# Patient Record
Sex: MALE | Race: WHITE | NOT HISPANIC OR LATINO | URBAN - METROPOLITAN AREA
[De-identification: names, ages, dates, MRNs, and addresses within clinical notes are randomized per-mention and may not be internally consistent; named-entity substitution may affect disease eponyms.]

---

## 2019-05-17 NOTE — PATIENT DISCUSSION
New Prescription: Pred Forte (prednisolone acetate): drops,suspension: 1% 1 drop as directed into both eyes 05-

## 2019-05-17 NOTE — PATIENT DISCUSSION
PATIENT WAS ADVISED OF THE INCREASED RISK OF NIGHT VISION SYMPTOMS ASSOCIATED WITH LARGER PUPILS. I DISCUSSED IN DETAIL THE POSSIBILITY OF GLARE, HALOS, STEAMERS AROUND LIGHTS AND STARBURSTS AT NIGHT THAT MAY AFFECT VISION FOR WORK. DETAILS OF THE STUDY PERFORMED BY THE  AND FDA TO DETERMINE PROBABILITY OF GLARE AS A RESULT OF HAVING LASIK WAS REVIEWED WITH THE PATIENT. PATIENT WAS ADVISED THAT EYE DROPS  MAY HELP TO DECREASE SOME OF THESE SYMPTOMS BUT THAT THERE IS NO GUARANTEE.

## 2019-05-17 NOTE — PATIENT DISCUSSION
New Prescription: Zymaxid (gatifloxacin): drops: 0.5% 1 drop four times a day as directed into affected eye 05-

## 2019-05-23 NOTE — PATIENT DISCUSSION
MYOPIA, OU- DISC OPT OF REFRACTIVE [id number]. PT UNDERSTANDS OPTIONS AND DESIRES TO PROCEED WITH LASIK TO IMPROVE VA AND REDUCE DEPENDENCY ON GLS/CTLS.

## 2019-05-23 NOTE — PATIENT DISCUSSION
HIGH ASTIGMATISM, OU- DISCUSSED INCREASED RISK OF GLARE AND HALOS AROUND LIGHTS WITH A 1% CHANCE OF LIMITING EVERYDAY ACTIVITIES, ALTHOUGH UNLIKELY.

## 2019-05-23 NOTE — PATIENT DISCUSSION
DISC W PT THAT THE NEED FOR READING GLASSES USUALLY OCCURS AT AGE [age] WHEN THAT TIME GOES. DISC EVEN POST LASIK, READING GLASSES WILL BE NEEDED.

## 2019-05-24 NOTE — PATIENT DISCUSSION
Continue: Zymaxid (gatifloxacin): drops: 0.5% 1 drop four times a day as directed into affected eye 05-

## 2019-05-24 NOTE — PATIENT DISCUSSION
Continue: Pred Forte (prednisolone acetate): drops,suspension: 1% 1 drop as directed into both eyes 05-

## 2020-07-30 ENCOUNTER — IMPORTED ENCOUNTER (OUTPATIENT)
Dept: URBAN - METROPOLITAN AREA CLINIC 50 | Facility: CLINIC | Age: 70
End: 2020-07-30

## 2020-08-25 ENCOUNTER — IMPORTED ENCOUNTER (OUTPATIENT)
Dept: URBAN - METROPOLITAN AREA CLINIC 50 | Facility: CLINIC | Age: 70
End: 2020-08-25

## 2021-04-17 ASSESSMENT — VISUAL ACUITY
OD_OTHER: 20/60. 20/100.
OD_CC: J1+
OS_BAT: 20/40
OS_OTHER: 20/40. 20/80.
OD_SC: 20/40
OD_BAT: 20/60
OS_PH: 20/25
OS_CC: J1+
OS_SC: 20/30

## 2021-04-17 ASSESSMENT — TONOMETRY
OS_IOP_MMHG: 18
OD_IOP_MMHG: 16

## 2021-08-24 ENCOUNTER — PREPPED CHART (OUTPATIENT)
Dept: URBAN - METROPOLITAN AREA CLINIC 53 | Facility: CLINIC | Age: 71
End: 2021-08-24

## 2022-04-04 ENCOUNTER — APPOINTMENT (RX ONLY)
Dept: URBAN - METROPOLITAN AREA CLINIC 57 | Facility: CLINIC | Age: 72
Setting detail: DERMATOLOGY
End: 2022-04-04

## 2022-04-04 DIAGNOSIS — L82.0 INFLAMED SEBORRHEIC KERATOSIS: ICD-10-CM

## 2022-04-04 DIAGNOSIS — L81.4 OTHER MELANIN HYPERPIGMENTATION: ICD-10-CM

## 2022-04-04 DIAGNOSIS — Z12.83 ENCOUNTER FOR SCREENING FOR MALIGNANT NEOPLASM OF SKIN: ICD-10-CM

## 2022-04-04 DIAGNOSIS — D18.0 HEMANGIOMA: ICD-10-CM

## 2022-04-04 DIAGNOSIS — L82.1 OTHER SEBORRHEIC KERATOSIS: ICD-10-CM

## 2022-04-04 DIAGNOSIS — L57.0 ACTINIC KERATOSIS: ICD-10-CM

## 2022-04-04 DIAGNOSIS — D22 MELANOCYTIC NEVI: ICD-10-CM

## 2022-04-04 DIAGNOSIS — L91.0 HYPERTROPHIC SCAR: ICD-10-CM

## 2022-04-04 PROBLEM — D18.01 HEMANGIOMA OF SKIN AND SUBCUTANEOUS TISSUE: Status: ACTIVE | Noted: 2022-04-04

## 2022-04-04 PROBLEM — D22.5 MELANOCYTIC NEVI OF TRUNK: Status: ACTIVE | Noted: 2022-04-04

## 2022-04-04 PROCEDURE — ? FULL BODY SKIN EXAM

## 2022-04-04 PROCEDURE — ? SUNSCREEN RECOMMENDATIONS

## 2022-04-04 PROCEDURE — ? COUNSELING

## 2022-04-04 PROCEDURE — ? LIQUID NITROGEN

## 2022-04-04 PROCEDURE — 17000 DESTRUCT PREMALG LESION: CPT | Mod: 59

## 2022-04-04 PROCEDURE — 99203 OFFICE O/P NEW LOW 30 MIN: CPT | Mod: 25

## 2022-04-04 PROCEDURE — ? ADDITIONAL NOTES

## 2022-04-04 PROCEDURE — 17003 DESTRUCT PREMALG LES 2-14: CPT | Mod: 59

## 2022-04-04 PROCEDURE — 17111 DESTRUCTION B9 LESIONS 15/>: CPT

## 2022-04-04 ASSESSMENT — LOCATION DETAILED DESCRIPTION DERM
LOCATION DETAILED: RIGHT POSTERIOR SHOULDER
LOCATION DETAILED: RIGHT SUPERIOR LATERAL LOWER BACK
LOCATION DETAILED: LEFT INFERIOR LATERAL MIDBACK
LOCATION DETAILED: RIGHT INFERIOR LATERAL UPPER BACK
LOCATION DETAILED: RIGHT INFERIOR LATERAL MIDBACK
LOCATION DETAILED: POSTERIOR MID-PARIETAL SCALP
LOCATION DETAILED: LEFT MEDIAL UPPER BACK
LOCATION DETAILED: LEFT MID-UPPER BACK
LOCATION DETAILED: RIGHT CENTRAL ZYGOMA
LOCATION DETAILED: LEFT LATERAL UPPER BACK
LOCATION DETAILED: RIGHT LATERAL UPPER BACK
LOCATION DETAILED: LEFT MEDIAL INFERIOR CHEST
LOCATION DETAILED: RIGHT MID-UPPER BACK
LOCATION DETAILED: LEFT INFERIOR MEDIAL UPPER BACK
LOCATION DETAILED: RIGHT MEDIAL UPPER BACK
LOCATION DETAILED: XIPHOID
LOCATION DETAILED: RIGHT SUPERIOR UPPER BACK
LOCATION DETAILED: LEFT SUPERIOR LATERAL LOWER BACK
LOCATION DETAILED: LEFT POSTERIOR SHOULDER
LOCATION DETAILED: RIGHT SUPERIOR LATERAL UPPER BACK
LOCATION DETAILED: RIGHT SUPERIOR LATERAL MIDBACK
LOCATION DETAILED: RIGHT SUPERIOR MEDIAL MIDBACK
LOCATION DETAILED: LEFT SUPERIOR LATERAL MIDBACK
LOCATION DETAILED: LEFT SUPERIOR HELIX
LOCATION DETAILED: SUPERIOR LUMBAR SPINE
LOCATION DETAILED: LEFT INFERIOR UPPER BACK
LOCATION DETAILED: RIGHT CENTRAL TEMPLE
LOCATION DETAILED: RIGHT INFERIOR UPPER BACK
LOCATION DETAILED: RIGHT LATERAL TRAPEZIAL NECK

## 2022-04-04 ASSESSMENT — LOCATION SIMPLE DESCRIPTION DERM
LOCATION SIMPLE: RIGHT LOWER BACK
LOCATION SIMPLE: RIGHT UPPER BACK
LOCATION SIMPLE: LOWER BACK
LOCATION SIMPLE: RIGHT ZYGOMA
LOCATION SIMPLE: LEFT UPPER BACK
LOCATION SIMPLE: ABDOMEN
LOCATION SIMPLE: RIGHT SHOULDER
LOCATION SIMPLE: RIGHT TEMPLE
LOCATION SIMPLE: POSTERIOR SCALP
LOCATION SIMPLE: LEFT LOWER BACK
LOCATION SIMPLE: CHEST
LOCATION SIMPLE: POSTERIOR NECK
LOCATION SIMPLE: LEFT EAR
LOCATION SIMPLE: LEFT SHOULDER

## 2022-04-04 ASSESSMENT — LOCATION ZONE DERM
LOCATION ZONE: FACE
LOCATION ZONE: NECK
LOCATION ZONE: ARM
LOCATION ZONE: SCALP
LOCATION ZONE: EAR
LOCATION ZONE: TRUNK

## 2022-04-04 NOTE — PROCEDURE: LIQUID NITROGEN
Post-Care Instructions: I reviewed with the patient in detail post-care instructions. Patient is to wear sunprotection, and avoid picking at any of the treated lesions. Pt may apply Vaseline to crusted or scabbing areas.
Number Of Freeze-Thaw Cycles: 1 freeze-thaw cycle
Show Applicator Variable?: Yes
Detail Level: Detailed
Duration Of Freeze Thaw-Cycle (Seconds): 3
Application Tool (Optional): Liquid Nitrogen Sprayer
Render Post-Care Instructions In Note?: no
Consent: The patient's consent was obtained including but not limited to risks of crusting, scabbing, blistering, scarring, darker or lighter pigmentary change, recurrence, incomplete removal and infection.
Medical Necessity Information: It is in your best interest to select a reason for this procedure from the list below. All of these items fulfill various CMS LCD requirements except the new and changing color options.
Spray Paint Text: The liquid nitrogen was applied to the skin utilizing a spray paint frosting technique.
Medical Necessity Clause: This procedure was medically necessary because the lesions that were treated were:

## 2022-07-12 ENCOUNTER — COMPREHENSIVE EXAM (OUTPATIENT)
Dept: URBAN - METROPOLITAN AREA CLINIC 53 | Facility: CLINIC | Age: 72
End: 2022-07-12

## 2022-07-12 DIAGNOSIS — R73.03: ICD-10-CM

## 2022-07-12 DIAGNOSIS — H43.813: ICD-10-CM

## 2022-07-12 DIAGNOSIS — H25.13: ICD-10-CM

## 2022-07-12 PROCEDURE — 92014 COMPRE OPH EXAM EST PT 1/>: CPT

## 2022-07-12 ASSESSMENT — VISUAL ACUITY
OD_SC: 20/40
OS_SC: 20/30-1
OU_CC: J1+
OS_GLARE: 20/25
OS_PH: 20/25
OD_PH: 20/25
OS_GLARE: 20/30
OD_GLARE: 20/30

## 2022-07-12 ASSESSMENT — TONOMETRY
OD_IOP_MMHG: 17
OS_IOP_MMHG: 17

## 2023-08-23 ENCOUNTER — EMERGENCY VISIT (OUTPATIENT)
Dept: URBAN - METROPOLITAN AREA CLINIC 53 | Facility: CLINIC | Age: 73
End: 2023-08-23

## 2023-08-23 DIAGNOSIS — H25.13: ICD-10-CM

## 2023-08-23 DIAGNOSIS — E11.9: ICD-10-CM

## 2023-08-23 DIAGNOSIS — H43.813: ICD-10-CM

## 2023-08-23 PROCEDURE — 92014 COMPRE OPH EXAM EST PT 1/>: CPT

## 2023-08-23 PROCEDURE — 92134 CPTRZ OPH DX IMG PST SGM RTA: CPT

## 2023-08-23 ASSESSMENT — VISUAL ACUITY
OS_GLARE: 20/25-2
OD_GLARE: 20/30
OS_SC: 20/25
OD_SC: 20/30-1
OD_GLARE: 20/30-2
OS_GLARE: 20/30
OD_PH: 20/20

## 2023-08-23 ASSESSMENT — TONOMETRY
OS_IOP_MMHG: 17
OD_IOP_MMHG: 16

## 2023-09-01 ENCOUNTER — COMPREHENSIVE EXAM (OUTPATIENT)
Dept: URBAN - METROPOLITAN AREA CLINIC 50 | Facility: LOCATION | Age: 73
End: 2023-09-01

## 2023-09-01 DIAGNOSIS — H33.322: ICD-10-CM

## 2023-09-01 DIAGNOSIS — H35.012: ICD-10-CM

## 2023-09-01 DIAGNOSIS — H43.812: ICD-10-CM

## 2023-09-01 PROCEDURE — 99215 OFFICE O/P EST HI 40 MIN: CPT

## 2023-09-01 ASSESSMENT — VISUAL ACUITY
OD_SC: 20/50-1
OS_SC: 20/30-1
OD_PH: 20/40

## 2023-09-01 ASSESSMENT — TONOMETRY
OD_IOP_MMHG: 14
OS_IOP_MMHG: 17

## 2024-11-11 ENCOUNTER — CONSULTATION/EVALUATION (OUTPATIENT)
Dept: URBAN - METROPOLITAN AREA CLINIC 53 | Facility: CLINIC | Age: 74
End: 2024-11-11

## 2024-11-11 DIAGNOSIS — H35.033: ICD-10-CM

## 2024-11-11 DIAGNOSIS — H25.13: ICD-10-CM

## 2024-11-11 DIAGNOSIS — E11.9: ICD-10-CM

## 2024-11-11 PROCEDURE — 99214 OFFICE O/P EST MOD 30 MIN: CPT

## 2024-11-11 PROCEDURE — 92134 CPTRZ OPH DX IMG PST SGM RTA: CPT

## 2024-11-11 PROCEDURE — 92015 DETERMINE REFRACTIVE STATE: CPT
